# Patient Record
(demographics unavailable — no encounter records)

---

## 2024-10-17 NOTE — PHYSICAL EXAM
[Normal Mood and Affect] : normal mood and affect [Able to Communicate] : able to communicate [Well Developed] : well developed [Well Nourished] : well nourished [Rotation to left] : rotation to left [Straightening consistent with spasm] : Straightening consistent with spasm [NL (0-180)] : full passive forward flexion 0-180 degrees [NL (0-90)] : full external rotation 0-90 degrees [Type 1 acromion] : Type 1 acromion [4th] : 4th [Left] : left foot [There are no fractures, subluxations or dislocations. No significant abnormalities are seen] : There are no fractures, subluxations or dislocations. No significant abnormalities are seen [NL (45)] : right lateral flexion 45 degrees [NL (80)] : right lateral rotation 80 degrees [FreeTextEntry9] : IR to T4 [de-identified] : - Jimmy [] : non-antalgic [FreeTextEntry8] : Tender medial gastroc muscle
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
## 2024-11-05 NOTE — ASSESSMENT
[FreeTextEntry1] : 25 y/o F pt with sinus pressure and nasal congestion. On exam, sigmoidal septal deviation, diffuse edema. No polyps seen on scope.  - sinus sx resolved with sinusitis regiment  Today with 1 week of nasal pain. On exam, minimal crusting b/l, inflammation around nasal valve possible autoimmune components as pt has lupus and Crohn's. Was debrided with rigid suction.  - start hydrocortisone ointment and mupirocin - continue Flonase. A topical steroid reduce mucosal swelling, illustrated appropriate use and how to reduce the risk of bleeding  - Nasal irrigation and showed how to use it to maximize effectiveness  - follow up if sx persist

## 2024-11-05 NOTE — HISTORY OF PRESENT ILLNESS
[de-identified] : pt also with sharp R Ear pain, with some throbbing x few weeks ago, cold and eating does it make it worse.  last night felt it was very painful, does clench her teeth.  no changes in hearing no Vertigo, tinnitus, drainage or facial weakness.  States went to hospital because was feeling sick with sore throat, congestion, fever and chills. Hospital put her on Augmentin which did not help with congestion and throat but fever chills got better. PCP put her on promethazine and prednisone for sore throat and congestion. Also is dong Flonase. Pt also with sinus pressure   Patient following up doing well. States face has has been getting swollen at jawline worse at left since december. Unsure what makes it come and go. Denies noticing if eating makes it worse, but clenches jaw often so could be related. Sharp and . Worse with chewing. Has had TMJ in the past.   States nose and sinuses have not been bothering her, doing well. Using flonase daily. Used saline gel all winter. Denies nosebleeds.   Patient follows up with TMJ and still has constant pain and tenderness at jawline. Tried using cupping device she bought on [employer] and its been helping. Using sour candies. Was scheduled for L SMG sialodochoplasty in August but cancelled. States both sides hurt equally. States it worsens with stress and clenching.   Occasionally gets foul smelling mucus from nose. Mightve had slight infection of sinuses last month. Denies sinus pressure or infections currently.   Patient is following up after trying azelastine for 3 months, states it helped her stuffy nose. States getting constant foul smell of old mucus in nose for last 2 months. Getting constant pressure at cheeks, under her nose at lip, and around top of head since end of May, states its gotten progressively worse. States when she coughs, it feels like pulsing. Only thing that helps is hot steam shower. Has tried sudafed which does not help. Tried zyrtec and allegra which did not help, states allergic to ragweed. PCP gave zpak for 6 days in April or May, unsure when, but had sore throat, fever and body aches. States uses netipot once a week as needed. Used flonase for 2 months which did not help her.   States PT is helping significantly with jaw/neck pain on right. States has been going to PT twice a week for the last 2 months, prior to that was less consistent. Has been going intermittently since January.  [FreeTextEntry1] : Patient is following up with 1 week of nostril pain. States insides of nostrils have a burning sensation, it hurts to breath or move. Also feels like skin along septum is swollen. Tried saline gel which isn't helping. Denies having pain or swelling like this in the past.  sinusitis regiment for facial pressure and nasal congestion. also foul smell resolved  has brook and chrons

## 2024-11-05 NOTE — PROCEDURE
[FreeTextEntry6] : Procedure performed: Nasal Endoscopy- Diagnostic Pre-op indication(s): nasal congestion Post-op indication(s): nasal congestion  Verbal and/or written consent obtained from patient Anterior rhinoscopy insufficient to account for symptoms Scope #: 3,  flexible fiber optic telescope  The scope was introduced in the nasal passage between the middle and inferior turbinates to exam the inferior portion of the middle meatus and the fontanelle, as well as the maxillary ostia.  Next, the scope was passed medically and posteriorly to the middle turbinates to examine the sphenoethmoid recess and the superior turbinate region. Upon visualization the finders are as follows: Nasal Septum: sigmoidal septal deviation  Bilateral - Mucosa: boggy turbinates, Mucous: scant, Polyp: not seen, Inferior Turbinate: boggy, Middle Turbinate: boggy, Superior Turbinate: normal, Inferior Meatus: narrow, Middle Meatus: narrow, Super Meatus:normal, Sphenoethmoidal Recess: clear diffuse edema

## 2024-11-05 NOTE — END OF VISIT
[FreeTextEntry3] : I personally saw and examined  the patient in detail.  I spoke to CHAN Walters regarding the assessment and plan of care. I performed the procedures and relevant physical exam.  I have reviewed the above assessment and plan of care and I agree.  I have made changes to the body of the note wherever necessary and appropriate

## 2024-11-05 NOTE — PHYSICAL EXAM
[Nasal Endoscopy Performed] : nasal endoscopy was performed, see procedure section for findings [Normal] : no rashes [de-identified] :  Jaw clicking and shifting as she opens her mouth worse on R

## 2024-11-05 NOTE — REVIEW OF SYSTEMS
[As Noted in HPI] : as noted in HPI [Nasal Congestion] : nasal congestion [Recurrent Sinus Infections] : recurrent sinus infections [Sinus Pressure] : sinus pressure [Negative] : Heme/Lymph [de-identified] : sore throat

## 2024-11-05 NOTE — CONSULT LETTER
[Please see my note below.] : Please see my note below. [FreeTextEntry1] : Dear Dr. PREMA WEBSTER \par  I had the pleasure of evaluating your patient SUMAN MENDOZA, thank you for allowing us to participate in their care. please see full note detailing our visit below.\par  If you have any questions, please do not hesitate to call me and I would be happy to discuss further. \par  \par  Francois Henriquez M.D.\par  Attending Physician,  \par  Department of Otolaryngology - Head and Neck Surgery\par  [facility] \par  Office: [phone]\par  Fax: [phone]\par  \par

## 2024-11-08 NOTE — PHYSICAL EXAM
[General Appearance - Alert] : alert [General Appearance - In No Acute Distress] : in no acute distress [Auscultation Breath Sounds / Voice Sounds] : lungs were clear to auscultation bilaterally [Abnormal Walk] : normal gait [Nail Clubbing] : no clubbing  or cyanosis of the fingernails [Musculoskeletal - Swelling] : no joint swelling seen [Motor Tone] : muscle strength and tone were normal [Skin Color & Pigmentation] : normal skin color and pigmentation [Skin Turgor] : normal skin turgor [] : no rash [Oriented To Time, Place, And Person] : oriented to person, place, and time [Affect] : the affect was normal [Impaired Insight] : insight and judgment were intact

## 2024-11-08 NOTE — PHYSICAL EXAM
[General Appearance - Alert] : alert [General Appearance - In No Acute Distress] : in no acute distress [Auscultation Breath Sounds / Voice Sounds] : lungs were clear to auscultation bilaterally [Abnormal Walk] : normal gait [Nail Clubbing] : no clubbing  or cyanosis of the fingernails [Musculoskeletal - Swelling] : no joint swelling seen [Motor Tone] : muscle strength and tone were normal [Skin Turgor] : normal skin turgor [Skin Color & Pigmentation] : normal skin color and pigmentation [] : no rash [Oriented To Time, Place, And Person] : oriented to person, place, and time [Affect] : the affect was normal [Impaired Insight] : insight and judgment were intact

## 2024-11-12 NOTE — ASSESSMENT
[FreeTextEntry1] : Ms. 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 Per patient also has RP and a malar rash (kid). WAs treated with HCQ from 2013 - 2016. Currently without s/s of aiCTD activity -- avise CTD test positive. serologies positive include PAYTON, RNP, histone (weak), RNA polymerase III (moderate), chromatin, johnson. ESR 61 -- RNP positive - CPK normal -- observe fr now - patient prefers to not start HCQ at this time - was on it for years and now feels completely fine. occasionally has joint pain but otherwise fine -- RNA polymerase III positive - no s/s of Scleroderma - h/o pulmonary issues including hemoptysis - rec f/u pulmonary for ct chest, pft, echo as adult baseline - recommend the patient get her testing done after holding off from rock climbing for 3 days and getting the labs done on the 4th day - check muscle enzymes for the muscle weakness - prescribed the patient a breathing test - connect patient with Dr. Criss Gavin MD (Pulmonology)  # UC dx 2017 with irregular bowel pattern, rectal bleeding, esophageal and gastric ulcerations, and elevated ESR. previously on Remicade (ineffective), Stelara (ineffective) -- no s/s of associated SpA at this time -- avoiding NSAID 2/2 potential exacerbation of her IBD  #amplified pain syndrome intermittent pain with FMTP on exam. with IBS -- has used diet and exercise to help this and generally doing okay -- no nsaid 2/2 IBD  #anemia -- ab negative for PA -- f/u heme   ----------------------------------------- More than 50% of the encounter was spent counseling the patient on differential, workup, disease course and treatment/management. Education was provided to the patient during this encounter. All questions and concerns were addressed and answered. The patient verbalized understanding and agreed to the plan.  Patient has been instructed to call for an appointment if new symptoms develop. Patient has been instructed to make a followup appointment in 3 months  Time spent on the encounter included, but is not limited to, preparing to see the patient, obtaining and/or reviewing separately obtained history, performing the evaluation, counseling and educating, independently interpreting results with communication to patient, order placement, referring and/or communicating with other health professionals as described, and documenting clinical information in the electronic health record

## 2024-11-12 NOTE — HISTORY OF PRESENT ILLNESS
[FreeTextEntry1] : Ms. Burgess is a non-smoker with a PMHx SLE, IBD- Crohn's, duodenal ulcer, depression, IBS, amplified pain syndrome, hyperthyroidism, headaches here for follow-up.   INTERVAL Hx not doing as well as last visit   in terms of joints  - curently experiencing 2 hours of AMs stiffnes  - multiple joitns small and large - pain all over   in terms of other associated issues  - was told has a lot of nasal and cartilage inflammation by her ENT  - is being treated by ENT  in terms of GI - worsening symptoms iwth lose stools and bloating    CARE TEAM Desmond Martinez MD, FACP (Gastroenterology) [phone] [address] [facility], [address], [place] [address]  --------------------------------------------------------------------------------------  pediatric rheum notes and adult GI notes were reviewed   # SLE dx 2013 with complaints of whole body pain felt to be more c/w pain amplification but was found to have lupus serologies on lab work.   WAs treated with HCQ from 2013 - 2016.  associated sxs - malar rash as a child - nothing recurrent - rp occasionally - no ulcers/gangrene - occasionally joint pain - no swelling or inflammatory s/s and linked to poor diet  # UC dx 2017 with irregular bowel pattern, rectal bleeding, esophageal and gastric ulcerations, and elevated ESR.  previously on Remicade (ineffective), Stelara (ineffective) -- reviewed adult and pediatric GI notes - small bowel capsule endoscopy scheduled  -- no s/s of associated SpA at this time  -- she notes that her diet makes a big difference with how she feels in terms of the gut as well as the joints  #amplified pain syndrome  intermittent pain  -- has used diet and exercise to help this and genreally doing okay  -- no nsaid 2/2 crohesn - occaisonally alieve -   #swollen rgiith salivary gland or lymph node  - occurred a copule of weeks ago - went the ER - was treated with both abx and steroids  -- no coplaints   a couple of weeks ago - developed swelling in the right lymph node  - only on e side - the right side - in the front - treated with abx and it went away  - also got prednisone   Meds: vit d supplements, iron,  PMHX - SLE, UC, depression, amplified pain, hyperthroid Fhx: nc Soc: non-smoker - works as a

## 2024-11-12 NOTE — HISTORY OF PRESENT ILLNESS
[FreeTextEntry1] : Ms. Burgess is a non-smoker with a PMHx SLE, IBD- Crohn's, duodenal ulcer, depression, IBS, amplified pain syndrome, hyperthyroidism, headaches here for follow-up.   INTERVAL Hx not doing as well as last visit   in terms of joints  - curently experiencing 2 hours of AMs stiffnes  - multiple joitns small and large - pain all over   in terms of other associated issues  - was told has a lot of nasal and cartilage inflammation by her ENT  - is being treated by ENT  in terms of GI - worsening symptoms iwth lose stools and bloating    CARE TEAM Desmond Martinez MD, FACP (Gastroenterology) [phone] [address] [facility], [address], [place] [address]  --------------------------------------------------------------------------------------  pediatric rheum notes and adult GI notes were reviewed   # SLE dx 2013 with complaints of whole body pain felt to be more c/w pain amplification but was found to have lupus serologies on lab work.   WAs treated with HCQ from 2013 - 2016.  associated sxs - malar rash as a child - nothing recurrent - rp occasionally - no ulcers/gangrene - occasionally joint pain - no swelling or inflammatory s/s and linked to poor diet  # UC dx 2017 with irregular bowel pattern, rectal bleeding, esophageal and gastric ulcerations, and elevated ESR.  previously on Remicade (ineffective), Stelara (ineffective) -- reviewed adult and pediatric GI notes - small bowel capsule endoscopy scheduled  -- no s/s of associated SpA at this time  -- she notes that her diet makes a big difference with how she feels in terms of the gut as well as the joints  #amplified pain syndrome  intermittent pain  -- has used diet and exercise to help this and genreally doing okay  -- no nsaid 2/2 crohesn - occaisonally alieve -   #swollen rgiith salivary gland or lymph node  - occurred a copule of weeks ago - went the ER - was treated with both abx and steroids  -- no coplaints   a couple of weeks ago - developed swelling in the right lymph node  - only on e side - the right side - in the front - treated with abx and it went away  - also got prednisone   Meds: vit d supplements, iron,  PMHX - SLE, UC, depression, amplified pain, hyperthroid Fhx: nc Soc: non-smoker - works as a

## 2024-11-25 NOTE — REVIEW OF SYSTEMS
Pt tolerated treatment today with no adverse reactions. Left unit ambulatory with a steady gait. [Joint Pain] : joint pain [Negative] : Heme/Lymph
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
## 2024-11-26 NOTE — PHYSICAL EXAM
[Normal Mood and Affect] : normal mood and affect [Able to Communicate] : able to communicate [Well Developed] : well developed [Well Nourished] : well nourished [NL (45)] : extension 45 degrees [Rotation to left] : rotation to left [Straightening consistent with spasm] : Straightening consistent with spasm [NL (0-180)] : full passive forward flexion 0-180 degrees [NL (0-90)] : full external rotation 0-90 degrees [Type 1 acromion] : Type 1 acromion [4th] : 4th [Left] : left foot [There are no fractures, subluxations or dislocations. No significant abnormalities are seen] : There are no fractures, subluxations or dislocations. No significant abnormalities are seen [de-identified] : left lateral flexion 30 degrees [de-identified] : left lateral rotation 75 degrees [de-identified] : right lateral flexion 30 degrees [TWNoteComboBox6] : right lateral rotation 75 degrees [FreeTextEntry9] : IR to T4 [de-identified] : - Jimmy [] : non-antalgic [FreeTextEntry8] : Tender medial gastroc muscle

## 2025-01-14 NOTE — PLAN
[TextEntry] : We discussed at length with the patient the options for treatment.  We discussed conservative care including physical therapy, acupuncture, massage therapy and chiropractic care.  We discussed injection therapy and even surgical intervention should the patient fail conservative care.  We discussed, risks, benefits, complications, alternatives, outcomes and expectations.   All questions answered.   Patient was advised to continue with physical therapy for her lower back.     She will obtain a copy of the lumbar MRI and forward that to us.   Medrol dose pack was prescribed. Risks and benefits were explained including but not limited to the possibilities of gastritis, indigestion, and other GI side effects. It was also explained that in patients with a history of diabetes mellitus, it may temporarily elevate their blood sugars which should be monitored at home. A refill was also prescribed to take the subsequent week if needed.
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
## 2025-01-14 NOTE — PHYSICAL EXAM
[Normal Mood and Affect] : normal mood and affect [Able to Communicate] : able to communicate [Well Developed] : well developed [Well Nourished] : well nourished [NL (45)] : right lateral flexion 45 degrees [NL (80)] : right lateral rotation 80 degrees [Rotation to left] : rotation to left [Straightening consistent with spasm] : Straightening consistent with spasm [NL (0-180)] : full passive forward flexion 0-180 degrees [NL (0-90)] : full external rotation 0-90 degrees [Type 1 acromion] : Type 1 acromion [4th] : 4th [Left] : left foot [There are no fractures, subluxations or dislocations. No significant abnormalities are seen] : There are no fractures, subluxations or dislocations. No significant abnormalities are seen [NL (90)] : forward flexion 90 degrees [NL (30)] : right lateral bending 30 degrees [Flexion] : flexion [Extension] : extension [Bending to right] : bending to right [FreeTextEntry9] : IR to T4 [de-identified] : - Jimmy [] : non-antalgic [FreeTextEntry8] : Tender medial gastroc muscle

## 2025-01-16 NOTE — PHYSICAL EXAM
[General Appearance - Alert] : alert [General Appearance - In No Acute Distress] : in no acute distress [Auscultation Breath Sounds / Voice Sounds] : lungs were clear to auscultation bilaterally [Nail Clubbing] : no clubbing  or cyanosis of the fingernails [Abnormal Walk] : normal gait [Musculoskeletal - Swelling] : no joint swelling seen [Motor Tone] : muscle strength and tone were normal [Skin Color & Pigmentation] : normal skin color and pigmentation [Skin Turgor] : normal skin turgor [] : no rash [Oriented To Time, Place, And Person] : oriented to person, place, and time [Impaired Insight] : insight and judgment were intact [Affect] : the affect was normal

## 2025-01-21 NOTE — HISTORY OF PRESENT ILLNESS
[FreeTextEntry1] : This visit was provided via telehealth using real-time 2-way audio-visual technology.    The patient was located at HOME in [place] at the time of the visit.   The provider was located at HOME in [place] at the time of the visit.   The patient and provider participated in the telehealth encounter.   Verbal consent for telehealth services was given by the patient.  Ms. Burgess is a non-smoker with a PMHx SLE, IBD- Crohn's, duodenal ulcer, depression, IBS, amplified pain syndrome, hyperthyroidism, headaches here for follow-up.   INTERVAL Hx  several issues to discuss  not doing well and in flare  bloodwork c/w flare - discussed worsening joint issues and body aches - ltos of pain an dstiffness eats gluten free now - consistently  adding exercise in generation   feels terrible and feels like her lupus was when she started  oral ulcers  jonit pain and muscles hurt   the gut thus far is okay - but going to GI next month last scope was okay  has some pain - bur more related to stress and not to her IBD type of symptoms sores in the mouth   CARE TEAM Desmond Martinez MD, FACP (Gastroenterology) [phone] [address] [facility], [address], [place] [address]  --------------------------------------------------------------------------------------  pediatric rheum notes and adult GI notes were reviewed   # SLE dx 2013 with complaints of whole body pain felt to be more c/w pain amplification but was found to have lupus serologies on lab work.   WAs treated with HCQ from 2013 - 2016.  associated sxs - malar rash as a child - nothing recurrent - rp occasionally - no ulcers/gangrene - occasionally joint pain - no swelling or inflammatory s/s and linked to poor diet  # UC dx 2017 with irregular bowel pattern, rectal bleeding, esophageal and gastric ulcerations, and elevated ESR.  previously on Remicade (ineffective), Stelara (ineffective) -- reviewed adult and pediatric GI notes - small bowel capsule endoscopy scheduled  -- no s/s of associated SpA at this time  -- she notes that her diet makes a big difference with how she feels in terms of the gut as well as the joints  #amplified pain syndrome  intermittent pain  -- has used diet and exercise to help this and genreally doing okay  -- no nsaid 2/2 crohesn - occaisonally alieve -   #swollen rgiith salivary gland or lymph node  - occurred a copule of weeks ago - went the ER - was treated with both abx and steroids  -- no coplaints   a couple of weeks ago - developed swelling in the right lymph node  - only on e side - the right side - in the front - treated with abx and it went away  - also got prednisone   Meds: vit d supplements, iron,  PMHX - SLE, UC, depression, amplified pain, hyperthroid Fhx: nc Soc: non-smoker - works as a

## 2025-01-21 NOTE — ASSESSMENT
[FreeTextEntry1] : Ms. Burgess is a non-smoker with a PMHx SLE, IBD- Crohns, duodenal ulcer, depression, IBS, amplified pain syndrome, hyperthyroidism, headaches here for f/u  - f/u TEB February 13 at 9 AM (patient aware) - f/u TEB March 20 at 8:30 AM (patient aware) - f/u TEB May 1 at 8:30 AM (patient aware)  complex paitent with multipole autoimmune issues  now in flare clincially and serologically very active new medication to be added  # SLE dx 2013 with complaints of whole body pain felt to be more c/w pain amplification but was found to have lupus serologies on lab work. Per patient also has RP and a malar rash (kid). WAs treated with HCQ from 2013 - 2016. Currently without s/s of aiCTD activity -- avise CTD test positive. serologies positive include PAYTON, RNP, histone (weak), RNA polymerase III (moderate), chromatin, johnson. ESR 61 -- RNP positive - CPK normal -- observe fr now - patient prefers to not start HCQ at this time - was on it for years and now feels completely fine. occasionally has joint pain but otherwise fine -- RNA polymerase III positive - no s/s of Scleroderma - h/o pulmonary issues including hemoptysis - rec f/u pulmonary for ct chest, pft, echo as adult baseline -- now in flare with worsening activity markers and increases in the histone ab which can mirror flares -- d/w patient MDP x1  -- d/w patietn therapy for SLE at this time - HCQ and benlsyta- wanst to start plaquenil at this time -- d/w aptient given autontibody profil - at high risk of progression - will treat at this time to help - connect patient with Dr. Criss Gavin MD (Pulmonology)  # new medication start -- current weight 185 - 83 kg - at 5 mg/kg / day -  mg daily  -- annual eye check  -- HCQ start - r/b/a discussed including but not limited to ocular changes due to due to pigment deposition in the posterior retina which can cause changes in color vision and limitation in peripheral vision .  This is a very rare complication with Plaquenil itself, but requires annual ophthalmologic evaluation.    We also monitor for changes in cell counts and the possibility of skin pigmentation changes, arrhythmia and photosensitivity. 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 previously on Remicade (ineffective), Stelara (ineffective) -- no s/s of associated SpA at this time -- avoiding NSAID 2/2 potential exacerbation of her IBD -- AVOID TNFi given currrent SLE symptoms and increased histone  #amplified pain syndrome intermittent pain with FMTP on exam. with IBS -- has used diet and exercise to help this and generally doing okay -- no nsaid 2/2 IBD  #anemia -- ab negative for PA -- f/u heme   -----------------------------------------  Systemic Lupus Erythematosus, known as lupus, is a chronic autoimmune disease that can affect any organ in the body posing threats to proper organ function and even to life. Therefore, close surveillance of all bodily functions is required, including but not limited to central and peripheral nervous system, ocular and auditory systems, cardiopulmonary function, kidney function, mucocutaneous and musculoskeletal systems as well as constitutional manifestations. Surveillance consists of history, physical, and laboratory tests. Treatment varies, but most of the drugs used are high risk and therefore also require close monitoring in the form of blood and urine tests. High risk medications used in the treatment of rheumatic diseases include steroids, disease modifying agents, immunosuppressive therapies, antimalarials, biologics, and chemotherapy. Regardless of which drug or class of drug, the potential toxicities of these therapies mandate close monitoring in the form of a history, physical, and laboratory tests.  More than 50% of the encounter was spent counseling the patient on differential, workup, disease course and treatment/management. Education was provided to the patient during this encounter. All questions and concerns were addressed and answered. The patient verbalized understanding and agreed to the plan.  Patient has been instructed to call for an appointment if new symptoms develop. Patient has been instructed to make a followup appointment in 3 months  Time spent on the encounter included, but is not limited to, preparing to see the patient, obtaining and/or reviewing separately obtained history, performing the evaluation, counseling and educating, independently interpreting results with communication to patient, order placement, referring and/or communicating with other health professionals as described, and documenting clinical information in the electronic health record

## 2025-01-21 NOTE — HISTORY OF PRESENT ILLNESS
[FreeTextEntry1] : This visit was provided via telehealth using real-time 2-way audio-visual technology.    The patient was located at [place] in [place] at the time of the visit.   The provider was located at HOME in [place] at the time of the visit.   The patient and provider participated in the telehealth encounter.   Verbal consent for telehealth services was given by the patient.  Ms. Burgess is a non-smoker with a PMHx SLE, IBD- Crohn's, duodenal ulcer, depression, IBS, amplified pain syndrome, hyperthyroidism, headaches here for follow-up.   INTERVAL Hx  several issues to discuss  not doing well and in flare  bloodwork c/w flare - discussed worsening joint issues and body aches - ltos of pain an dstiffness eats gluten free now - consistently  adding exercise in generation   feels terrible and feels like her lupus was when she started  oral ulcers  jonit pain and muscles hurt   the gut thus far is okay - but going to GI next month last scope was okay  has some pain - bur more related to stress and not to her IBD type of symptoms sores in the mouth   CARE TEAM Desmond Martinez MD, FACP (Gastroenterology) [phone] [address] [facility], [address], [place] [address]  --------------------------------------------------------------------------------------  pediatric rheum notes and adult GI notes were reviewed   # SLE dx 2013 with complaints of whole body pain felt to be more c/w pain amplification but was found to have lupus serologies on lab work.   WAs treated with HCQ from 2013 - 2016.  associated sxs - malar rash as a child - nothing recurrent - rp occasionally - no ulcers/gangrene - occasionally joint pain - no swelling or inflammatory s/s and linked to poor diet  # UC dx 2017 with irregular bowel pattern, rectal bleeding, esophageal and gastric ulcerations, and elevated ESR.  previously on Remicade (ineffective), Stelara (ineffective) -- reviewed adult and pediatric GI notes - small bowel capsule endoscopy scheduled  -- no s/s of associated SpA at this time  -- she notes that her diet makes a big difference with how she feels in terms of the gut as well as the joints  #amplified pain syndrome  intermittent pain  -- has used diet and exercise to help this and genreally doing okay  -- no nsaid 2/2 crohesn - occaisonally alieve -   #swollen rgiith salivary gland or lymph node  - occurred a copule of weeks ago - went the ER - was treated with both abx and steroids  -- no coplaints   a couple of weeks ago - developed swelling in the right lymph node  - only on e side - the right side - in the front - treated with abx and it went away  - also got prednisone   Meds: vit d supplements, iron,  PMHX - SLE, UC, depression, amplified pain, hyperthroid Fhx: nc Soc: non-smoker - works as a

## 2025-02-11 NOTE — REASON FOR VISIT
[FreeTextEntry1] : Crohn's disease, dysphagia, nausea, epigastric pain, constipation, reflux, hemorrhoids

## 2025-02-11 NOTE — CONSULT LETTER
[FreeTextEntry1] : Dear Dr. Angel Landers,   I had the pleasure of seeing your patient SUMAN MENDOZA in the office today.  My office note is attached. PLEASE READ THE "ASSESSMENT" SECTION OF THE NOTE TO SEE MY IMPRESSION AND PLAN.   Thank you very much for allowing me to participate in the care of your patient.   Sincerely,   Desmond Martinez M.D., FAC, FACP Director, Celiac Program at [facility]  of Medicine, [facility] [employer] [facility] [employer] at [facility] Adjunct  of Medicine, [facility] Practice Director, [facility] - Gastroenterology at [address] - [address] [place] [address] Tel: [phone] Email: [email or web address]     The attached note has been created using a voice recognition system (Dragon).  There may be some misspellings and typos.  Please call my office if you have any issues or questions.

## 2025-02-11 NOTE — ASSESSMENT
[FreeTextEntry1] : Patient with Crohn's disease for about 9 years who has no evidence of active disease and is on no medication.  She is on pantoprazole 40 mg a day for reflux and denies heartburn although she does get dysphagia with rice at the level of the throat.  She also has nausea and intermittent epigastric pain.  She has actually been constipated.  In order to evaluate the small bowel, A capsule endoscopy has been scheduled. The risks, benefits, alternatives, and limitations of the procedure were explained.  Patient was sent for an esophagram to evaluate the dysphagia.  Stool will be sent for fecal calprotectin.  Patient will continue pantoprazole 40 mg a day.  The patient was advised that she must see a gynecologist regarding the ovarian dermoid cyst seen on CT scan.  Patient was advised to use Citrucel powder in 8 ounces of water once a day to help with her constipation.   Plan from 4/1/2024 - Patient with Crohn's disease diagnosed 7 to 8 years ago.  She has not been on any treatment for over 4 years.  She had previously tried Remicade and Stelara.  She also has a diagnosis of lupus.  Her most recent evaluation was a CT enterography last week which did not show any clear active inflammatory bowel disease.  Previous colonoscopy showed evidence of ileitis and colitis throughout the colon.  Capsule endoscopy several years ago showed multiple ulcers in the mid to distal small bowel.  She also had grade a reflux esophagitis and gastritis on her previous endoscopy.  She complains of constant nausea and intermittent mid abdominal pain.  She does not have diarrhea.  She reports occasional rectal bleeding.  Her reflux symptoms are controlled with pantoprazole 40 mg a day.  I had a long discussion with the patient and her mother regarding her Crohn's disease.  I advised that she has seen several gastroenterologists and must choose 1 to evaluate and treat her.  She has opted to stay with me.  We will need to reevaluate the patient to see whether or not she has active disease to determine the best treatment.  Bloodwork was sent for CBC, Chem-delroy, TSH, ESR, C-reactive protein, iron studies, B12, folate, vitamin D, celiac markers, hepatitis B and C serologies, quantiferon gold.  Stool will be sent for fecal calprotectin.  An EGD and colonoscopy have been scheduled. The risks, benefits, alternatives, and limitations of the procedures, including the possibility of missed lesions, were explained.  The patient will also require a capsule endoscopy at some point but we will hold off on this at the present time.  I advised the patient to continue pantoprazole but to change the timing of it.  She has been taking it at night.  I advised that she take the pantoprazole 30 to 60 minutes before breakfast in the morning.  The patient was advised to use the stool softener daily to help with her bowel movements.  The patient was given ondansetron 4 mg to use as needed for her nausea.  Patient was advised that she must follow-up with her gynecologist regarding the right ovarian dermoid cyst seen on CT scan.

## 2025-02-11 NOTE — HISTORY OF PRESENT ILLNESS
[FreeTextEntry1] : The patient has Crohn's disease for several years.  She also has lupus.  She is seen for the first time since undergoing EGD and colonoscopy on April 19, 2024.  We reviewed those results.  EGD was significant for a mildly irregular Z-line but there were no significant findings on endoscopic biopsies.  Colonoscopy was significant for removal of 1 polyp with negative biopsies as well as internal and external hemorrhoids which have been symptomatic on occasion but not lately.  Of note, no colitis was seen and biopsies from the terminal ileum and throughout the colon were negative.  The patient is on no medication for Crohn's disease.  She will be starting on hydroxychloroquine tomorrow for her lupus.  She is on pantoprazole 40 mg a day.  The patient has been strictly gluten-free for the past 3 months.  She denies any heartburn.  She does get dysphagia at the level of the throat with rice but denies regurgitation.  She also notes nausea but denies vomiting.  She gets intermittent throbbing epigastric pain.  She has recently been constipated moving her bowels every 1 to 2 days with hard stools.  She denies melena or bright red blood per rectum.  The patient's weight is stable.  She has not seen the gynecologist as recommended for an ovarian dermoid cyst seen on a previous CT scan.   Note from 4/1/2024 - The patient was a patient of Dr. Dietrich, who has passed away.  The patient is now seeing me to transfer care. We spent some time reviewing the patient's medical history.  The patient is a 23-year-old woman who was diagnosed with Crohn's disease at age 15 or 16.  She was last seen by Dr. Dietrich on January 20, 2023.  Previous to this and since, the patient has seen Dr. Flores.  She saw Dr. Flores on February 26, 2024.  She also saw Dr. Mcgraw on November 28, 2023.  She last had EGD and colonoscopy by Dr. Flores on October 22, 2021.  EGD was significant for grade a reflux esophagitis and gastritis in the antrum.  There were no significant findings on endoscopic biopsies.  Colonoscopy showed evidence of terminal ileitis and acute and chronic inflammation throughout the colon with ulcerations in the sigmoid colon.  The patient last had a capsule endoscopy on September 7, 2017 which showed multiple ulcers in the mid and distal bowel.  Ulcers were aphthous in nature consistent with small bowel Crohn's disease.  The patient recently had a CT enterography on March 25, 2024 which showed minimal focal hyperenhancement in the terminal ileum which was thought to possibly be focal peristalsis.  Otherwise, there was no evidence of inflammatory changes in the bowel.  A 2.4 cm right ovarian dermoid cyst was seen.  The patient states that after her initial diagnosis of Crohn's disease, she was treated with Remicade at around age 18 or 19.  She took 3-4 doses and stopped feeling that the medicine made her feel worse.  She also was on Stelara for about a year over 4 years ago.  She has not been on any medication for Crohn's disease for at least 4 years.  She also has a diagnosis of lupus which is not being treated as well.  The patient complains of intermittent mid abdominal pain that occurs daily.  She states that the pain is sometimes worse after eating.  She reports constant nausea which is worse after eating.  She denies vomiting.  When she is not on any medication for reflux and even when she was on omeprazole, the patient had significant heartburn.  She is currently on pantoprazole 40 mg a day and denies heartburn or dysphagia.  She has 1 bowel movement a day which is sometimes small requiring straining.  Stools are solid.  She has been using Dulcolax stool softener intermittently with a good response.  She sees occasional bright red blood on the stool and on the toilet paper.  She denies melena.  The patient's weight is stable.  She does get joint pains and bodyaches attributed to lupus.  The patient was given Linzess by Dr. Flores but never took it.  She was hospitalized in September for cellulitis.  Otherwise, the patient has not been admitted to the hospital in the past year and denies any cardiac issues.

## 2025-02-11 NOTE — CONSULT LETTER
[FreeTextEntry1] : Dear Dr. Angel Landers,   I had the pleasure of seeing your patient SUMAN MENDOZA in the office today.  My office note is attached. PLEASE READ THE "ASSESSMENT" SECTION OF THE NOTE TO SEE MY IMPRESSION AND PLAN.   Thank you very much for allowing me to participate in the care of your patient.   Sincerely,   Desmond Martinez M.D., FAC, FACP Director, Celiac Program at [facility]  of Medicine, [facility] [employer] [facility] at [facility] Adjunct  of Medicine, [facility] Practice Director, [facility] - Gastroenterology at [address] - [address] [place] [address] Tel: [phone] Email: [email or web address]     The attached note has been created using a voice recognition system (Dragon).  There may be some misspellings and typos.  Please call my office if you have any issues or questions.

## 2025-02-15 NOTE — HISTORY OF PRESENT ILLNESS
[FreeTextEntry1] : GYN/Ref:  Ms. Zaldivar, 24 years old, referred for a pelvic mass.    Imaging: 3/25/24 CT Enterography ([facility]) LOWER CHEST: Unchanged scattered left and right lower lobe cystic changes with subtle adjacent ground glass opacity, unchanged from 12 2022. LIVER: Within normal limits. BILE DUCTS: Normal caliber. GALLBLADDER: Within normal limits. SPLEEN: Within normal limits. PANCREAS: Within normal limits. ADRENALS: Within normal limits. KIDNEYS/URETERS: Within normal limits. BLADDER: Within normal limits. REPRODUCTIVE ORGANS: There is a 2.4 cm right ovarian dermoid again noted with an adjacent collapsed corpus luteal cyst measuring 2.1 cm in size (both seen on 301-101). The uterus and left ovary are normal in appearance. BOWEL: There is minimal focal hyperenhancement of the terminal ileum, which could be due to focal peristalsis. The bowel is otherwise unremarkable in appearance without evidence of wall thickening or abnormal enhancement. Appendix is normal. PERITONEUM: No ascites. VESSELS: Within normal limits. RETROPERITONEUM/LYMPH NODES: No lymphadenopathy. ABDOMINAL WALL: Within normal limits. BONES: Within normal limits. IMPRESSION: There is a small focus of hypoenhancement of the terminal ileum, which is decompressed and favored to be due to peristalsis rather than active inflammation. The bowel is otherwise unremarkable in appearance. No evidence of stricturing or penetrating disease. Unchanged 2.4 cm right ovarian dermoid. Management per gynecology is recommended.  POB: PGYN: PMH: Meds: Allergies: PSH: Social Hx: FH:    Health Maintenance: Mammogram: Colonoscopy: DEXA: PAP:

## 2025-02-15 NOTE — PHYSICAL EXAM
[Chaperone Present] : A chaperone was present in the examining room during all aspects of the physical examination [[id number]] : A chaperone was present during the pelvic exam. [Normal] : Recto-Vaginal Exam: Normal [Fully active, able to carry on all pre-disease performance without restriction] : Status 0 - Fully active, able to carry on all pre-disease performance without restriction
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
## 2025-02-18 NOTE — PHYSICAL EXAM
[Normal Mood and Affect] : normal mood and affect [Able to Communicate] : able to communicate [Well Developed] : well developed [Well Nourished] : well nourished [NL (45)] : right lateral flexion 45 degrees [NL (80)] : right lateral rotation 80 degrees [Rotation to left] : rotation to left [Straightening consistent with spasm] : Straightening consistent with spasm [NL (90)] : forward flexion 90 degrees [NL (30)] : right lateral bending 30 degrees [Flexion] : flexion [Extension] : extension [Bending to right] : bending to right [NL (0-180)] : full passive forward flexion 0-180 degrees [NL (0-90)] : full external rotation 0-90 degrees [Type 1 acromion] : Type 1 acromion [4th] : 4th [] : full range of motion of foot [Left] : left foot [There are no fractures, subluxations or dislocations. No significant abnormalities are seen] : There are no fractures, subluxations or dislocations. No significant abnormalities are seen

## 2025-03-20 NOTE — CONSULT LETTER
[Please see my note below.] : Please see my note below. [FreeTextEntry1] : Dear Dr. PREMA WEBSTER \par  I had the pleasure of evaluating your patient SUMAN MENDOZA, thank you for allowing us to participate in their care. please see full note detailing our visit below.\par  If you have any questions, please do not hesitate to call me and I would be happy to discuss further. \par  \par  Francois Henriquez M.D.\par  Attending Physician,  \par  Department of Otolaryngology - Head and Neck Surgery\par  [facility] \par  Office: [phone]\par  Fax: [phone]\par  \par

## 2025-03-20 NOTE — HISTORY OF PRESENT ILLNESS
[de-identified] : Patient is following up after trying azelastine for 3 months, states it helped her stuffy nose. States getting constant foul smell of old mucus in nose for last 2 months. Getting constant pressure at cheeks, under her nose at lip, and around top of head since end of May, states its gotten progressively worse. States when she coughs, it feels like pulsing. Only thing that helps is hot steam shower. Has tried sudafed which does not help. Tried zyrtec and allegra which did not help, states allergic to ragweed. PCP gave zpak for 6 days in April or May, unsure when, but had sore throat, fever and body aches. States uses netipot once a week as needed. Used flonase for 2 months which did not help her.   States PT is helping significantly with jaw/neck pain on right. States has been going to PT twice a week for the last 2 months, prior to that was less consistent. Has been going intermittently since January.   Patient is following up with 1 week of nostril pain. States insides of nostrils have a burning sensation, it hurts to breath or move. Also feels like skin along septum is swollen. Tried saline gel which isn't helping. Denies having pain or swelling like this in the past.  sinusitis regiment for facial pressure and nasal congestion. also foul smell resolved has lupus and chrons   Patient has been struggling with dull aching nasal pain for last few months worse over last 2-3 weeks. States has been having a lupus flare up, getting worked up to be started on hydroxychloroquine again. Was on a steroid pack last week, did not help her nose. States nose is also stuffy for last week. Using ayr saline gel at bedtime, states it helps but not as much as it used to. Denies using flonase or saline wash.  [FreeTextEntry1] : patient following up for evaluation of her sinuses. Within the last 12 months has had 3 courses of antibiotics and steroids, most recent 3 weeks ago by pcp. States headaches, congestion and pressure improved on the last course of medications a bit, but symptoms persisted. Returned to her pcp and was given a zpak with no improvements, feels made her symptoms worse. Still with severe headaches at the sides and back of her head, dizziness, cough, stuffy and runny nose. Sense of smell comes and goes. With some relief when she does nasal irrigation with neti pot, doing them consistently for the last few weeks, but using for months now. Using flonase daily for months.

## 2025-03-20 NOTE — CONSULT LETTER
[Please see my note below.] : Please see my note below. [FreeTextEntry1] : Dear Dr. PREMA WEBSTER \par  I had the pleasure of evaluating your patient SUMAN MENDOZA, thank you for allowing us to participate in their care. please see full note detailing our visit below.\par  If you have any questions, please do not hesitate to call me and I would be happy to discuss further. \par  \par  Franocis Henriquez M.D.\par  Attending Physician,  \par  Department of Otolaryngology - Head and Neck Surgery\par  [facility] \par  Office: [phone]\par  Fax: [phone]\par  \par

## 2025-03-20 NOTE — PROCEDURE
[FreeTextEntry6] : Procedure performed: Nasal Endoscopy- Diagnostic Pre-op indication(s): nasal congestion Post-op indication(s): nasal congestion  Verbal and/or written consent obtained from patient Anterior rhinoscopy insufficient to account for symptoms Scope #: 3,  flexible fiber optic telescope  The scope was introduced in the nasal passage between the middle and inferior turbinates to exam the inferior portion of the middle meatus and the fontanelle, as well as the maxillary ostia.  Next, the scope was passed medically and posteriorly to the middle turbinates to examine the sphenoethmoid recess and the superior turbinate region. Upon visualization the finders are as follows: Nasal Septum: sigmoidal septal deviation  Bilateral - Mucosa: boggy turbinates, Mucous: thick secretions and crusting bilaterally, Polyp: not seen, Inferior Turbinate: boggy, Middle Turbinate: boggy, Superior Turbinate: normal, Inferior Meatus: narrow, Middle Meatus: narrow, Super Meatus:normal, Sphenoethmoidal Recess: clear diffuse edema

## 2025-03-20 NOTE — REVIEW OF SYSTEMS
[As Noted in HPI] : as noted in HPI [Nasal Congestion] : nasal congestion [Recurrent Sinus Infections] : recurrent sinus infections [Sinus Pressure] : sinus pressure [Negative] : Heme/Lymph [de-identified] : sore throat

## 2025-03-20 NOTE — PROCEDURE
Accession # (Optional): [id number] Accession # (Optional): [id number] [FreeTextEntry6] : Procedure performed: Nasal Endoscopy- Diagnostic Pre-op indication(s): nasal congestion Post-op indication(s): nasal congestion  Verbal and/or written consent obtained from patient Anterior rhinoscopy insufficient to account for symptoms Scope #: 3,  flexible fiber optic telescope  The scope was introduced in the nasal passage between the middle and inferior turbinates to exam the inferior portion of the middle meatus and the fontanelle, as well as the maxillary ostia.  Next, the scope was passed medically and posteriorly to the middle turbinates to examine the sphenoethmoid recess and the superior turbinate region. Upon visualization the finders are as follows: Nasal Septum: sigmoidal septal deviation  Bilateral - Mucosa: boggy turbinates, Mucous: thick secretions and crusting bilaterally, Polyp: not seen, Inferior Turbinate: boggy, Middle Turbinate: boggy, Superior Turbinate: normal, Inferior Meatus: narrow, Middle Meatus: narrow, Super Meatus:normal, Sphenoethmoidal Recess: clear diffuse edema

## 2025-03-20 NOTE — HISTORY OF PRESENT ILLNESS
[de-identified] : Patient is following up after trying azelastine for 3 months, states it helped her stuffy nose. States getting constant foul smell of old mucus in nose for last 2 months. Getting constant pressure at cheeks, under her nose at lip, and around top of head since end of May, states its gotten progressively worse. States when she coughs, it feels like pulsing. Only thing that helps is hot steam shower. Has tried sudafed which does not help. Tried zyrtec and allegra which did not help, states allergic to ragweed. PCP gave zpak for 6 days in April or May, unsure when, but had sore throat, fever and body aches. States uses netipot once a week as needed. Used flonase for 2 months which did not help her.   States PT is helping significantly with jaw/neck pain on right. States has been going to PT twice a week for the last 2 months, prior to that was less consistent. Has been going intermittently since January.   Patient is following up with 1 week of nostril pain. States insides of nostrils have a burning sensation, it hurts to breath or move. Also feels like skin along septum is swollen. Tried saline gel which isn't helping. Denies having pain or swelling like this in the past.  sinusitis regiment for facial pressure and nasal congestion. also foul smell resolved has lupus and chrons   Patient has been struggling with dull aching nasal pain for last few months worse over last 2-3 weeks. States has been having a lupus flare up, getting worked up to be started on hydroxychloroquine again. Was on a steroid pack last week, did not help her nose. States nose is also stuffy for last week. Using ayr saline gel at bedtime, states it helps but not as much as it used to. Denies using flonase or saline wash.  [FreeTextEntry1] : patient following up for evaluation of her sinuses. Within the last 12 months has had 3 courses of antibiotics and steroids, most recent 3 weeks ago by pcp. States headaches, congestion and pressure improved on the last course of medications a bit, but symptoms persisted. Returned to her pcp and was given a zpak with no improvements, feels made her symptoms worse. Still with severe headaches at the sides and back of her head, dizziness, cough, stuffy and runny nose. Sense of smell comes and goes. With some relief when she does nasal irrigation with neti pot, doing them consistently for the last few weeks, but using for months now. Using flonase daily for months.

## 2025-03-20 NOTE — REASON FOR VISIT
[Subsequent Evaluation] : a subsequent evaluation for [FreeTextEntry2] : congestion and sinus pressure

## 2025-03-20 NOTE — REVIEW OF SYSTEMS
[As Noted in HPI] : as noted in HPI [Nasal Congestion] : nasal congestion [Recurrent Sinus Infections] : recurrent sinus infections [Sinus Pressure] : sinus pressure [Negative] : Heme/Lymph [de-identified] : sore throat

## 2025-03-20 NOTE — ASSESSMENT
[FreeTextEntry1] : 23 y/o F pt presents with sinus pressure and nasal congestion. On exam, sigmoidal septal deviation, diffuse edema. No polyps seen on scope.   Discussed options: 1) continue conservative management  2) course of abx and steroids  - will try Bactrim  3) CT scan of sinuses for further evaluation  - patient elected for option 2 and 3, will call to discuss results - discussed if + for sinus disease may consider office sinus procedure, if negative follow up with neuro for further evaluation  Allergy evaluation and possible treatments discussed. Additionally, we will proceed with a regiment of decongestants and medication to reduce inflammation and an extended course of antibiotics. The patient was instructed to continue nasal irrigation and topical steroid spray for over 1 month. The goal is to try to break the cycle of inflammation and obstruction leading to resolution of the acute and chronic symptoms. It will hopefully allow for maintenance therapy to reach disease areas and avoid further intervention. - continue Flonase. A topical steroid reduce mucosal swelling, illustrated appropriate use and how to reduce the risk of bleeding  - Nasal irrigation and showed how to use it to maximize effectiveness  discussed other possible causes of HA that will not respond to sinus Tx, will see what scan show
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
## 2025-04-02 NOTE — PHYSICAL EXAM
[Normal Mood and Affect] : normal mood and affect [Able to Communicate] : able to communicate [Well Developed] : well developed [Well Nourished] : well nourished [NL (45)] : right lateral flexion 45 degrees [NL (80)] : right lateral rotation 80 degrees [Rotation to left] : rotation to left [Straightening consistent with spasm] : Straightening consistent with spasm [NL (90)] : forward flexion 90 degrees [NL (30)] : right lateral bending 30 degrees [Flexion] : flexion [Extension] : extension [Bending to right] : bending to right [NL (0-180)] : full passive forward flexion 0-180 degrees [NL (0-90)] : full external rotation 0-90 degrees [Type 1 acromion] : Type 1 acromion [4th] : 4th [Left] : left foot [There are no fractures, subluxations or dislocations. No significant abnormalities are seen] : There are no fractures, subluxations or dislocations. No significant abnormalities are seen [FreeTextEntry9] : IR to T4 [de-identified] : - Jimmy [] : non-antalgic [FreeTextEntry8] : Tender medial gastroc muscle
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
## 2025-04-02 NOTE — PHYSICAL EXAM
[Normal Mood and Affect] : normal mood and affect [Able to Communicate] : able to communicate [Well Developed] : well developed [Well Nourished] : well nourished [NL (45)] : right lateral flexion 45 degrees [NL (80)] : right lateral rotation 80 degrees [Rotation to left] : rotation to left [Straightening consistent with spasm] : Straightening consistent with spasm [NL (90)] : forward flexion 90 degrees [NL (30)] : right lateral bending 30 degrees [Flexion] : flexion [Extension] : extension [Bending to right] : bending to right [NL (0-180)] : full passive forward flexion 0-180 degrees [NL (0-90)] : full external rotation 0-90 degrees [Type 1 acromion] : Type 1 acromion [4th] : 4th [Left] : left foot [There are no fractures, subluxations or dislocations. No significant abnormalities are seen] : There are no fractures, subluxations or dislocations. No significant abnormalities are seen [FreeTextEntry9] : IR to T4 [de-identified] : - Jimmy [] : non-antalgic [FreeTextEntry8] : Tender medial gastroc muscle

## 2025-04-09 NOTE — HISTORY OF PRESENT ILLNESS
[FreeTextEntry1] : GYN/Ref:  Ms. Zaldivar, 24 years old, referred for a pelvic mass.    2019- TVUS- Uterus- 7.4 x 5.4 x 2.7 cm, EMS- 10 mm, RO- 1.2 x 3.7 x 2.3 cm, LTO- 2.6 x 2.1 x 1.3 cm  Imaging: 3/25/24 CT Enterography ([facility]) LOWER CHEST: Unchanged scattered left and right lower lobe cystic changes with subtle adjacent ground glass opacity, unchanged from 12 2022. LIVER: Within normal limits. BILE DUCTS: Normal caliber. GALLBLADDER: Within normal limits. SPLEEN: Within normal limits. PANCREAS: Within normal limits. ADRENALS: Within normal limits. KIDNEYS/URETERS: Within normal limits. BLADDER: Within normal limits. REPRODUCTIVE ORGANS: There is a 2.4 cm right ovarian dermoid again noted with an adjacent collapsed corpus luteal cyst measuring 2.1 cm in size (both seen on 301-101). The uterus and left ovary are normal in appearance. BOWEL: There is minimal focal hyperenhancement of the terminal ileum, which could be due to focal peristalsis. The bowel is otherwise unremarkable in appearance without evidence of wall thickening or abnormal enhancement. Appendix is normal. PERITONEUM: No ascites. VESSELS: Within normal limits. RETROPERITONEUM/LYMPH NODES: No lymphadenopathy. ABDOMINAL WALL: Within normal limits. BONES: Within normal limits. IMPRESSION: There is a small focus of hypoenhancement of the terminal ileum, which is decompressed and favored to be due to peristalsis rather than active inflammation. The bowel is otherwise unremarkable in appearance. No evidence of stricturing or penetrating disease. Unchanged 2.4 cm right ovarian dermoid. Management per gynecology is recommended.  POB: PGYN: PMH: Meds: Allergies: PSH: Social Hx: FH:    Health Maintenance: Mammogram: Colonoscopy: 4/2024-  PAP:

## 2025-04-11 NOTE — ASSESSMENT
[FreeTextEntry1] : 25 y/o F with hx of GERD, Crohn's disease, SLE, TMJ syndrome who presents for evaluation of headaches. Patient has a history of headaches that are suggestive of migraines since age 10, but now having different headaches that are more severe and daily.  Given her recent sinus infection and history of Crohn's and SLE, would confirm no intracranial pathology or venous thrombosis or aneurysm noted.  Unfortunately given her Crohn's we are somewhat limited to medications as she is more likely to have adverse effects.  Would avoid triptans.   PLAN: - MRI Brain w/wo - MRA and MRV - Medrol dose pack - Start Nurtec 75mg ODT every other day for prophylaxis. R/A/B discussed - If not tolerated, may consider switching to Qulipta - HA diary - May consider referral to HA specialist   We discussed the importance of adequate hydration and making sure to drink eight eight-ounce glasses of water daily.   We also discussed general symptoms that should prompt immediate presentation to the emergency department for evaluation of acute stroke including: sudden onset of focal weakness, numbness, difficulty with speech production or comprehension, slurred speech, visual changes, gait imbalance, and/or sudden/severe headache.

## 2025-04-11 NOTE — PHYSICAL EXAM
[FreeTextEntry1] :   General: Cooperative, NAD HEENT: NC/AT, no carotid bruits, +scalp allodynia Lungs: CTAB Chest: RRR, no murmurs Extremities: nontender, no erythema Neurological Examination: MS: AOx3. Appropriately interactive, normal affect. Speech fluent w/o paraphasic errors CN: No papilledma noted, PERLL, EOMI, V1-3 sensation intact, face symmetric, hearing intact, palate elevates symmetrically, tongue midline, SCM equal bilaterally Motor: normal bulk and tone, no tremor, rigidity or bradykinesia.  5/5 all over Sens: Intact to light touch. Reflexes: 2/4 all over, downgoing toes b/l Coord:  No dysmetria, JUNE intact Gait: Normal

## 2025-04-11 NOTE — HISTORY OF PRESENT ILLNESS
[FreeTextEntry1] :  [facility] NEUROLOGY AT [place]  CC: Headaches HPI: 23 y/o F with hx of GERD, Crohn's disease, SLE, TMJ syndrome who presents for evaluation of headaches.   Has a prior hx of headaches, dating back to around age 10, had episodes of pounding headaches.  Would get associated photophobia, phonophobia, nausea.  Were manageable.  Recently increased frequency.  End of Feb was diagnosed with sinus infection. Since then has had increased HAs.  Described as pounding, throbbing HAs, located bitemporal region, with associated nausea, generalized weakness.  No photophobia or phonophobia.  These are different from her previous headaches. Very intense with feeling weakness all over.   Current frequency is few times per week, 5-6/week.  In between has a mild soreness.   Has tried: Excedrin, Biofreeze Went to ED 3/10 and received HA cocktail that helped.  Had CT head done at [facility] Undergoes PT for Jaw/neck pain on Right.  Denies associated vision changes with headaches. But sometimes gets "streaks of light" in her vision, can be associated with HA or withou  Mother with chronic headaches

## 2025-04-28 NOTE — ASSESSMENT
[FreeTextEntry1] : 25 y/o F with hx of GERD, Crohn's disease, SLE, TMJ syndrome who presents for evaluation of headaches. Patient has a history of headaches that are suggestive of migraines since age 10, but now having different headaches that are more severe and daily.  MRI/MRA/MRV normal.   Unfortunately given her Crohn's we are somewhat limited to medications as she is more likely to have adverse effects.  Would avoid triptans. Nurtec was not tolerated. Will try Qulipta.   Also likely having caffeine withdrawal headaches as she has been using Tylenol with caffeine.  We discussed stopping this as she is at risk of developing Medication overuse headaches even with Tylenol.   PLAN: - Start Qulipta 100mg daily for prophylaxis. R/A/B discussed - HA diary - Referral to HA specialist as she may benefit from injectables or botox   We discussed the importance of adequate hydration and making sure to drink eight eight-ounce glasses of water daily.   We also discussed general symptoms that should prompt immediate presentation to the emergency department for evaluation of acute stroke including: sudden onset of focal weakness, numbness, difficulty with speech production or comprehension, slurred speech, visual changes, gait imbalance, and/or sudden/severe headache.

## 2025-04-28 NOTE — HISTORY OF PRESENT ILLNESS
[FreeTextEntry1] :  [facility] NEUROLOGY AT [place]  CC: Headaches HPI: 25 y/o F with hx of GERD, Crohn's disease, SLE, TMJ syndrome who presents for f/u of headaches.   4/11/25: Has a prior hx of headaches, dating back to around age 10, had episodes of pounding headaches.  Would get associated photophobia, phonophobia, nausea.  Were manageable.  Recently increased frequency.  End of Feb was diagnosed with sinus infection. Since then has had increased HAs.  Described as pounding, throbbing HAs, located bitemporal region, with associated nausea, generalized weakness.  No photophobia or phonophobia.  These are different from her previous headaches. Very intense with feeling weakness all over.   Current frequency is few times per week, 5-6/week.  In between has a mild soreness.   Has tried: Excedrin, Biofreeze Went to ED 3/10 and received HA cocktail that helped.  Had CT head done at [facility] Undergoes PT for Jaw/neck pain on Right.  Denies associated vision changes with headaches. But sometimes gets "streaks of light" in her vision, can be associated with HA or without.  Mother with chronic headaches    Today 4/28/25: MRI Brain, MRA head and MRV all normal. Given nurtec and medrol dose pack. Headaches are still daily.  Nurtec hurt stomach but helped the headache.  Taking Tylenol with caffeine daily for past 1 month.

## 2025-07-09 NOTE — PHYSICAL EXAM
[FreeTextEntry1] :   General: Cooperative, NAD HEENT: NC/AT, no carotid bruits, +scalp allodynia Lungs: CTAB Chest: RRR, no murmurs Extremities: nontender, no erythema Neurological Examination: MS: AOx3. Appropriately interactive, normal affect. Speech fluent w/o paraphasic errors CN:  PERLL, EOMI, V1-3 sensation intact, face symmetric, hearing intact, palate elevates symmetrically, tongue midline, SCM equal bilaterally Motor: normal bulk and tone, no tremor, rigidity or bradykinesia.  5/5 all over Sens: Intact to light touch. Reflexes: 2/4 all over, downgoing toes b/l Coord:  No dysmetria, JUNE intact Gait: Normal

## 2025-07-09 NOTE — ASSESSMENT
[FreeTextEntry1] : 25 y/o F with hx of GERD, Crohn's disease, SLE, TMJ syndrome who presents for evaluation of headaches. Patient has a history of headaches that are suggestive of migraines since age 10, but now having different headaches that are more severe and daily.  MRI/MRA/MRV normal.  Caffeine withdrawal headaches appear to be resolved.   Unfortunately given her Crohn's we are somewhat limited to medications as she is more likely to have adverse effects.  Would avoid triptans. Nurtec was not tolerated. She never filled her Qulipta prescription.  Interestingly headaches somewhat improved after Reiki but may have recently recurred due to hormonal fluctuations.     PLAN: - Ubrelvy samples given to try on recent episodic headaches  - If headache frequency increases to >3/week, have advised to  prescription for Qulipta 100mg daily for prophylaxis. R/A/B discussed - Can continue to try Reiki, acupuncture - HA diary - Previously referred to HA specialist as she may benefit from injectables or botox   We discussed the importance of adequate hydration and making sure to drink eight eight-ounce glasses of water daily.   We also discussed general symptoms that should prompt immediate presentation to the emergency department for evaluation of acute stroke including: sudden onset of focal weakness, numbness, difficulty with speech production or comprehension, slurred speech, visual changes, gait imbalance, and/or sudden/severe headache.

## 2025-07-09 NOTE — HISTORY OF PRESENT ILLNESS
[FreeTextEntry1] :  [facility] [place]  CC: Headaches HPI: 23 y/o F with hx of GERD, Crohn's disease, SLE, TMJ syndrome who presents for f/u of headaches.   4/11/25: Has a prior hx of headaches, dating back to around age 10, had episodes of pounding headaches.  Would get associated photophobia, phonophobia, nausea.  Were manageable.  Recently increased frequency.  End of Feb was diagnosed with sinus infection. Since then has had increased HAs.  Described as pounding, throbbing HAs, located bitemporal region, with associated nausea, generalized weakness.  No photophobia or phonophobia.  These are different from her previous headaches. Very intense with feeling weakness all over.   Current frequency is few times per week, 5-6/week.  In between has a mild soreness.   Has tried: Excedrin, Biofreeze Went to ED 3/10 and received HA cocktail that helped.  Had CT head done at [facility] Undergoes PT for Jaw/neck pain on Right.  Denies associated vision changes with headaches. But sometimes gets "streaks of light" in her vision, can be associated with HA or without.  Mother with chronic headaches    4/28/25: MRI Brain, MRA head and MRV all normal. Given nurtec and medrol dose pack. Headaches are still daily.  Nurtec hurt stomach but helped the headache.  Taking Tylenol with caffeine daily for past 1 month.    Today 7/9/25: Started on Qulipta at last visit, but did not receive.  Had daily headaches before trying Reiki.  Headaches resolved and have recurred this past week, possible pending menstrual cycle. Has taken Ibuprofen 3x/week.